# Patient Record
Sex: MALE | Race: WHITE
[De-identification: names, ages, dates, MRNs, and addresses within clinical notes are randomized per-mention and may not be internally consistent; named-entity substitution may affect disease eponyms.]

---

## 2019-09-23 ENCOUNTER — HOSPITAL ENCOUNTER (EMERGENCY)
Dept: HOSPITAL 83 - ED | Age: 44
Discharge: HOME | End: 2019-09-23
Payer: SELF-PAY

## 2019-09-23 VITALS — DIASTOLIC BLOOD PRESSURE: 65 MMHG | SYSTOLIC BLOOD PRESSURE: 127 MMHG

## 2019-09-23 VITALS — DIASTOLIC BLOOD PRESSURE: 58 MMHG | SYSTOLIC BLOOD PRESSURE: 120 MMHG

## 2019-09-23 VITALS — DIASTOLIC BLOOD PRESSURE: 48 MMHG | SYSTOLIC BLOOD PRESSURE: 123 MMHG

## 2019-09-23 VITALS — DIASTOLIC BLOOD PRESSURE: 62 MMHG | SYSTOLIC BLOOD PRESSURE: 111 MMHG

## 2019-09-23 VITALS — BODY MASS INDEX: 25.77 KG/M2 | WEIGHT: 180 LBS | HEIGHT: 70 IN

## 2019-09-23 VITALS — DIASTOLIC BLOOD PRESSURE: 51 MMHG

## 2019-09-23 VITALS — SYSTOLIC BLOOD PRESSURE: 152 MMHG | DIASTOLIC BLOOD PRESSURE: 54 MMHG

## 2019-09-23 VITALS — DIASTOLIC BLOOD PRESSURE: 55 MMHG | SYSTOLIC BLOOD PRESSURE: 109 MMHG

## 2019-09-23 VITALS — DIASTOLIC BLOOD PRESSURE: 54 MMHG | SYSTOLIC BLOOD PRESSURE: 121 MMHG

## 2019-09-23 VITALS — DIASTOLIC BLOOD PRESSURE: 78 MMHG

## 2019-09-23 VITALS — DIASTOLIC BLOOD PRESSURE: 71 MMHG

## 2019-09-23 DIAGNOSIS — L02.31: Primary | ICD-10-CM

## 2019-09-23 DIAGNOSIS — L03.317: ICD-10-CM

## 2019-09-23 LAB
ALBUMIN SERPL-MCNC: 3 GM/DL (ref 3.1–4.5)
ALP SERPL-CCNC: 87 U/L (ref 45–117)
ALT SERPL W P-5'-P-CCNC: 15 U/L (ref 12–78)
AST SERPL-CCNC: 10 IU/L (ref 3–35)
BASOPHILS # BLD AUTO: 0.1 10*3/UL (ref 0–0.1)
BASOPHILS NFR BLD AUTO: 0.4 % (ref 0–1)
BUN SERPL-MCNC: 8 MG/DL (ref 7–24)
CHLORIDE SERPL-SCNC: 110 MMOL/L (ref 98–107)
CREAT SERPL-MCNC: 0.58 MG/DL (ref 0.7–1.3)
EOSINOPHIL # BLD AUTO: 0.3 10*3/UL (ref 0–0.4)
EOSINOPHIL # BLD AUTO: 2.2 % (ref 1–4)
ERYTHROCYTE [DISTWIDTH] IN BLOOD BY AUTOMATED COUNT: 13.6 % (ref 0–14.5)
HCT VFR BLD AUTO: 55.3 % (ref 42–52)
HGB BLD-MCNC: 18.9 G/DL (ref 14–18)
LYMPHOCYTES # BLD AUTO: 2.2 10*3/UL (ref 1.3–4.4)
LYMPHOCYTES NFR BLD AUTO: 16 % (ref 27–41)
MCH RBC QN AUTO: 35.3 PG (ref 27–31)
MCHC RBC AUTO-ENTMCNC: 34.2 G/DL (ref 33–37)
MCV RBC AUTO: 103.2 FL (ref 80–94)
MONOCYTES # BLD AUTO: 1 10*3/UL (ref 0.1–1)
MONOCYTES NFR BLD MANUAL: 7.5 % (ref 3–9)
NEUT #: 10.2 10*3/UL (ref 2.3–7.9)
NEUT %: 73.3 % (ref 47–73)
NRBC BLD QL AUTO: 0 10*3/UL (ref 0–0)
PLATELET # BLD AUTO: 260 10*3/UL (ref 130–400)
PMV BLD AUTO: 9.7 FL (ref 9.6–12.3)
POTASSIUM SERPL-SCNC: 3.7 MMOL/L (ref 3.5–5.1)
PROT SERPL-MCNC: 6.3 GM/DL (ref 6.4–8.2)
RBC # BLD AUTO: 5.36 10*6/UL (ref 4.5–5.9)
SODIUM SERPL-SCNC: 140 MMOL/L (ref 136–145)
WBC NRBC COR # BLD AUTO: 13.9 10*3/UL (ref 4.8–10.8)

## 2019-09-23 NOTE — NUR
MEDICATED WITH DILAUDID IV PER ORDER, FOR C/O RIGHT BUTTOCK PAIN. RATES PAIN 7
ON PAIN SCALE 0-10. ALSO MEDICATED WITH ZOFRAN FOR NAUSEA, SEE EMAR.

## 2019-09-23 NOTE — NUR
Patient given 10mg of Atomidate to sedation for I&D procedure. Patient
continues to be lightly sedated. Per Dr. Min, patient given 10mg of
Atomidate. Patient became sedated. VSS. Dr. Min started procedure.

## 2019-09-23 NOTE — NUR
Patient started to arouse, moving due to pain from procedure. 20mg of Atomidate
given IV and flushed with 0.9NS. Patient became more sedated. Dr. Min
finished procedure and packed wound. Photo of wound taken post I&D. Will
continue to monitor.

## 2019-09-25 ENCOUNTER — HOSPITAL ENCOUNTER (EMERGENCY)
Dept: HOSPITAL 83 - ED | Age: 44
Discharge: HOME | End: 2019-09-25
Payer: SELF-PAY

## 2019-09-25 VITALS — BODY MASS INDEX: 25.77 KG/M2 | HEIGHT: 70 IN | WEIGHT: 180 LBS

## 2019-09-25 DIAGNOSIS — Z48.01: ICD-10-CM

## 2019-09-25 DIAGNOSIS — L02.31: Primary | ICD-10-CM
